# Patient Record
Sex: FEMALE | Race: WHITE
[De-identification: names, ages, dates, MRNs, and addresses within clinical notes are randomized per-mention and may not be internally consistent; named-entity substitution may affect disease eponyms.]

---

## 2020-02-25 ENCOUNTER — HOSPITAL ENCOUNTER (OUTPATIENT)
Dept: HOSPITAL 63 - SURG | Age: 45
End: 2020-02-25
Attending: INTERNAL MEDICINE
Payer: OTHER GOVERNMENT

## 2020-02-25 VITALS — SYSTOLIC BLOOD PRESSURE: 109 MMHG | DIASTOLIC BLOOD PRESSURE: 48 MMHG

## 2020-02-25 DIAGNOSIS — D12.2: ICD-10-CM

## 2020-02-25 DIAGNOSIS — Z86.010: ICD-10-CM

## 2020-02-25 DIAGNOSIS — E03.9: ICD-10-CM

## 2020-02-25 DIAGNOSIS — D12.5: ICD-10-CM

## 2020-02-25 DIAGNOSIS — Z98.890: ICD-10-CM

## 2020-02-25 DIAGNOSIS — K57.30: ICD-10-CM

## 2020-02-25 DIAGNOSIS — Z12.11: Primary | ICD-10-CM

## 2020-02-25 LAB — U PREG PATIENT: NEGATIVE

## 2020-02-25 PROCEDURE — 81025 URINE PREGNANCY TEST: CPT

## 2020-02-25 PROCEDURE — 45380 COLONOSCOPY AND BIOPSY: CPT

## 2020-02-25 PROCEDURE — 88305 TISSUE EXAM BY PATHOLOGIST: CPT

## 2020-02-27 NOTE — PATHOLOGY
[facility] Accession Number: [id number]

.                                                                01

Material submitted:                                        .

PART A: sigmoid colon - SIGMOID POLYP

PART B: colon - ASCENDING COLON POLYP. Modifiers: ascending

.                                                                01

Clinical history:                                          .

None provided

.                                                                02

**********************************************************************

Diagnosis:

A. Colon biopsy, sigmoid polyp:

- Tubular adenoma.

.

B. Colon biopsies, ascending colon polyp:

- Tubular adenoma.

([facility]:pit 02/27/2020)

[facility]  02/27/2020  0949 Local

**********************************************************************

.                                                                02

Comment:

There is no high-grade dysplasia or evidence of malignancy.

([facility]:pit 02/27/2020)

.                                                                02

Electronically signed:                                     .

Venu Colmenares MD, Pathologist

NPI- [id number]

.                                                                01

Gross description:                                         .

A.  The specimen is received in formalin, labeled "Bia Creedon,

sigmoid polyp".  Received is a segment of pale tan soft tissue measuring

0.4 cm in maximum dimensions.  The specimen is submitted entirely in

cassette A1.

.

B.  The specimen is received in formalin, labeled "Bia Creedon,

ascending polyp".  Received are three segments of pale tan soft tissue

ranging in size from 0.1 to 0.3 cm in maximum dimensions.  The specimen is

submitted entirely in cassette B1.

([facility]; 2/26/2020)

[facility]AC  02/26/2020  1602 Local

.                                                                02

Pathologist provided ICD-10:

D12.5, D12.2

.                                                                02

CPT                                                        .

[id number], [id number]

Specimen Comment: A courtesy copy of this report has been sent to [phone]

Specimen Comment: Report sent to 

***Performed at:  01

   [address], [place], [place]  [id number]

   MD Ramiro Bowens MD Phone:  [phone]

***Performed at:  02

   [address]  [id number]

   MD Venu Colmenares MD Phone:  [phone]